# Patient Record
Sex: FEMALE
[De-identification: names, ages, dates, MRNs, and addresses within clinical notes are randomized per-mention and may not be internally consistent; named-entity substitution may affect disease eponyms.]

---

## 2019-01-31 ENCOUNTER — HOSPITAL ENCOUNTER (INPATIENT)
Dept: HOSPITAL 74 - JLDR | Age: 39
LOS: 3 days | Discharge: HOME | DRG: 540 | End: 2019-02-03
Attending: OBSTETRICS & GYNECOLOGY | Admitting: OBSTETRICS & GYNECOLOGY
Payer: COMMERCIAL

## 2019-01-31 VITALS — BODY MASS INDEX: 25.7 KG/M2

## 2019-01-31 DIAGNOSIS — Z22.330: ICD-10-CM

## 2019-01-31 DIAGNOSIS — Z3A.39: ICD-10-CM

## 2019-01-31 LAB
ANION GAP SERPL CALC-SCNC: 10 MMOL/L (ref 8–16)
APTT BLD: 26.4 SECONDS (ref 25.2–36.5)
BASOPHILS # BLD: 0.4 % (ref 0–2)
BUN SERPL-MCNC: 4 MG/DL (ref 7–18)
CALCIUM SERPL-MCNC: 8.6 MG/DL (ref 8.5–10.1)
CHLORIDE SERPL-SCNC: 107 MMOL/L (ref 98–107)
CO2 SERPL-SCNC: 21 MMOL/L (ref 21–32)
CREAT SERPL-MCNC: 0.7 MG/DL (ref 0.55–1.3)
DEPRECATED RDW RBC AUTO: 15.2 % (ref 11.6–15.6)
EOSINOPHIL # BLD: 0 % (ref 0–4.5)
GLUCOSE SERPL-MCNC: 226 MG/DL (ref 74–106)
HCT VFR BLD CALC: 43.4 % (ref 32.4–45.2)
HGB BLD-MCNC: 14.7 GM/DL (ref 10.7–15.3)
INR BLD: 0.85 (ref 0.83–1.09)
LYMPHOCYTES # BLD: 9 % (ref 8–40)
MCH RBC QN AUTO: 27.7 PG (ref 25.7–33.7)
MCHC RBC AUTO-ENTMCNC: 34 G/DL (ref 32–36)
MCV RBC: 81.5 FL (ref 80–96)
MONOCYTES # BLD AUTO: 4.7 % (ref 3.8–10.2)
NEUTROPHILS # BLD: 85.9 % (ref 42.8–82.8)
PLATELET # BLD AUTO: 150 K/MM3 (ref 134–434)
PMV BLD: 9.4 FL (ref 7.5–11.1)
POTASSIUM SERPLBLD-SCNC: 3.2 MMOL/L (ref 3.5–5.1)
PT PNL PPP: 10 SEC (ref 9.7–13)
RBC # BLD AUTO: 5.32 M/MM3 (ref 3.6–5.2)
SODIUM SERPL-SCNC: 137 MMOL/L (ref 136–145)
WBC # BLD AUTO: 12.3 K/MM3 (ref 4–10)

## 2019-01-31 RX ADMIN — Medication SCH: at 09:28

## 2019-01-31 RX ADMIN — FERROUS SULFATE TAB EC 324 MG (65 MG FE EQUIVALENT) SCH MG: 324 (65 FE) TABLET DELAYED RESPONSE at 17:29

## 2019-01-31 RX ADMIN — FERROUS SULFATE TAB EC 324 MG (65 MG FE EQUIVALENT) SCH: 324 (65 FE) TABLET DELAYED RESPONSE at 08:00

## 2019-01-31 NOTE — OP
Operative Note





- Note:


Operative Date: 01/31/19


Pre-Operative Diagnosis: Failure to descend, NRFHT, Failed TOLAC


Operation: Same


Findings: 





VMI, Direct OP, No nuchal or meconium. Weight pending. Apgars 7/9. Normal tubes 

and ovaries bilaterally


Surgeon: Deidre Schmidt


Assistant: Ray Araya


Anesthesia: Spinal


Estimated Blood Loss (mls): 600

## 2019-01-31 NOTE — OP
DATE OF OPERATION:  2019

 

PREOPERATIVE DIAGNOSIS:  A 39-week pregnancy, failure to descend, non-reassuring

fetal heart tracing, failed trial of labor after  section.  

 

POSTOPERATIVE DIAGNOSIS:  A 39-week pregnancy, failure to descend, non-reassuring

fetal heart tracing, failed trial of labor after  section.  

 

PROCEDURE:  Repeat low transverse  section.  

 

ANESTHESIA:  Spinal.

 

ESTIMATED BLOOD LOSS:  600 mL.

 

SURGEON:  Deidre Schmidt MD

 

ASSISTANT:  AJIT Singh

 

INTRAVENOUS FLUIDS:  Per anesthesia record.  

 

URINE OUTPUT:  Clear urine 200 mL at the end of the procedure.

 

FINDINGS:  Viable male infant, direct occiput posterior, Apgar 7, 9, 

weight pending, normal tubes and ovaries bilaterally.  

 

COMPLICATIONS:  None.

 

CONDITION:  Stable to recovery room

 

NATURE OF PROCEDURE:  After appropriate consents were signed, the patient was taken

to the operating room where spinal anesthesia was administered.  Watts catheter was

inserted.  The abdomen was prepped and draped in the normal sterile fashion. 

Time-out was performed confirming correct patient and procedure.  

 

A Pfannenstiel skin incision was made through the prior incision and carried through

to the underlying layers until the fascia was nicked in the midline.  The fascia was

then extended laterally with the Lugo scissors.  The inferior aspect of the fascia

was grasped with the Kocher clamps, tented upwards.  The rectus muscles were

dissected off bluntly and with the Lugo scissors.  Attention was then paid to the

superior aspect, which was taken down in a similar fashion.  The rectus muscles were

grasped in the midline with Allis clamps and  with the scalpel.  The

peritoneum was then entered bluntly.  Bladder blade was inserted.  Vesicular uterine

serosa reflection was grasped, nicked in the midline, and extended laterally with the

Metzenbaum scissors.  The bladder flap was then created digitally.  The bladder blade

was readjusted.  The uterus was then incised in the low transverse fashion.  Clear

amniotic fluid was noted.  

 

The infant was immediately noted to be direct OP presentation.  The Infants head was

delivered without difficulty.  The infants right shoulder was delivered without

difficulty.  The infants right arm had to be delivered to accommodate the left

shoulder for delivery.  The cord was clamped and cut.  The infant was handed off to

the awaiting pediatric staff.  The placenta was then removed manually.  The uterus

was cleared of all clot and debris.  

 

The hysterotomy was closed in 2 layers with a 0 Vicryl with good hemostasis.  The

muscle was then reapproximated with a 2-0 chromic.  The fascia was closed with a 0

Vicryl, the skin was closed with staples.  Sponge, lap, needle counts were correct

x3. Ancef was given at the start of the procedure.  Patient was taken from the

operating room to the recovery room in stable condition.  

 

 

MD WANDA [id number]

DD: 2019 03:10

DT: 2019 07:08

Job #:  [id number]

## 2019-01-31 NOTE — PN
Progress Note, Labor


  ** Vaginal Exam #1


Labor Exam Date: 19


Fetal Heart Rate (range): Cat II


Dilatation: 10


Effacement (%): 100


Amniotic Membrane Status: Ruptured


Fetal Presentation: Vertex/Position


Fetal Station: 0


Remarks: 





Patient pushing ineffectively for over 30 minutes now despite good coaching 

from team. Fetal vertex with little descent and only more caput. 


FHT with recurrent late decelerations and then prolonged bradycardia to the 80'

s with moderate variability. Fetal baseline has been 110's throughout labor 

course.


Given ineffective pushing, narrow pelvis and a good amount of pushing left in 

light of this fetal tracing,  delivery recommended.


Risks discussed, consents signed.


Anesthesia, nursery alerted.





JENI Schmidt MD

## 2019-01-31 NOTE — HP
Past Medical History





- Admission


Chief Complaint: Uterine  contractions


History of Present Illness: 





37yo  @ 39wks here with uterine contractions. +ROM. 





Preg c/b AMA, history of C/S x 1 for malpresentation, SCT, GBS positive


History Source: Patient





- Past Medical History


CNS: No: Alzheimer's, CVA, Dementia, Migraine, Multiple Sclerosis, Peripheral 

Neuropathy, Parkinson's, Seizure, Syncope, TIA, Vertigo, Other


Cardiovascular: No: AFIB, Aneurysm, Aortic Insufficiency, Aortic Stenosis, CAD, 

CHF, Deep Vein Thrombosis, HTN, Hyperlipdemia, MI, Mitral Insufficiency, Mitral 

Stenosis, Murmur, Pulmonary Hypertension, Other


Pulmonary: No: Asthma, Bronchitis, Cancer, COPD, O2 Dependent, Pneumonia, 

Previously Intubated, Pulmonary Embolus, Pulmonary Fibrosis, Sleep Apnea, Other


Gastrointestinal: No: Ascites, Cancer, Constipation, Crohn's Disease, 

Diverticulitis, Diverticulosis, Esophageal Varices, Gastritis, GERD, GI Bleed, 

Hemorrhoids, Hiatal Hernia, Inflamatory Bowel Disease, Irritable Bowel Disease, 

Pancreatitis, Peptic Ulcer Disease, Ulcerative Colitis, Other


...: 2


...Para: 1


...Term: 1


... Weeks Gestation by Dates: 39.3


...EDC by Dates: 19


Heme/Onc: Yes: Sickle Cell Trait





- Past Surgical History


Past Surgical History: Yes: 


Hx Myomectomy: No


Hx Transabdominal Cerclage: No





- Smoking History


Smoking history: Never smoked





- Alcohol/Substance Use


Hx Alcohol Use: No


History of Substance Use: reports: None





- Social History


Usual Living Arrangement: Yes: With Spouse


ADL: Independent


History of Recent Travel: No





Review of Systems





- Review of Systems


Constitutional: denies: No Symptoms, Chills, Diaphoresis, Fever, Lethargy, Loss 

of Appetite, Malaise, Night Sweats, Unintentional Wgt. Loss, Weakness, Other


Eyes: denies: No Symptoms, Blind Spots, Blurred Vision, Double Vision, Eye Pain

, Floaters, Photophobia, Recent Change in Vision, Other


HENT: denies: No Symptoms, Difficult Swallowing, Ear Discharge, Ear Pain, 

Epistaxis, Gingival Bleeding, Hearing Loss, Mouth Swelling, Nasal Congestion, 

Ocular Prosthesis, Throat Pain, Toothache, Ringing in Ears, Other


Neck: denies: No Symptoms, Decreased ROM, Lumps, Pain on Movement, Stiffness, 

Swollen Glands, Tenderness, Other


Cardiovascular: denies: No Symptoms, Chest Pain, Edema, Palpitations, Shortness 

of Breath, Other


Respiratory: denies: No Symptoms, Cough, Exercise Intolerance, Hemoptysis, 

Orthopnea, PND, Snoring, SOB, SOB on Exertion, Wheezing, Other


Gastrointestinal: denies: No Symptoms, Abdominal Pain, Bloating, Constipation, 

Diarrhea, Dysphagia, Indigestion, Melena, Nausea, Rectal Bleeding, Vomiting, 

Vomiting Blood, Other


Genitourinary: denies: No Symptoms, Burning, Discharge, Dysuria, Flank Pain, 

Frequency, Hematuria, Incontinence, Lesions, Menses, Pain, Testicular Mass, 

Testicular Pain, Testicular Swelling, Urgency, Vaginal Bleeding, Other





Physical Exam - Maternity


Constitutional: Yes: Well Nourished


Eyes: Yes: WNL





- Abdominal Exam/OB


Number of Fetuses: Single


Fetal Presentation: Vertex


Contractions: Yes


Regularity: Regular


Intensity: Mod/Strong


Fetal Monitor Mode: External


Fetal Heart Rate Location: [facility]


Category: I


Accelerations: Uniform


Decelerations: Early





- Vaginal Exam/OB


Vaginal Bleediing: No


Dilatation (cm): 9-10


Effacement (%): 100


Amniotic Membrane Status: Ruptured


Nitrazine Test: Positive


Amniotic Fluid: Yes: Clear


Fetal Presentation: Vertex/Position


Fetal Station: 0





- Physical Exam


Edema: No





Assessment/Plan





37yo  @ 39+wks here in labor, prior C/S, for TOLAC


Admit to L&D


NPO, IVFs


Amp for GBS positive


CEFM, toco; Cat I on admission


Risk of TOLAC discussed including arrest of first/second stages of labor 

ultimately requiring , uterine rupture risk requiring emergent C-

Section delivery and it's associated maternal/fetal morbidity/mortality. All 

questions answered. Consents signed


TOLAC, anticipate 





JENI Schmidt MD

## 2019-02-01 LAB
BASOPHILS # BLD: 0.3 % (ref 0–2)
DEPRECATED RDW RBC AUTO: 15.3 % (ref 11.6–15.6)
EOSINOPHIL # BLD: 0.1 % (ref 0–4.5)
HCT VFR BLD CALC: 38.2 % (ref 32.4–45.2)
HGB BLD-MCNC: 12.8 GM/DL (ref 10.7–15.3)
LYMPHOCYTES # BLD: 7.3 % (ref 8–40)
MCH RBC QN AUTO: 27 PG (ref 25.7–33.7)
MCHC RBC AUTO-ENTMCNC: 33.5 G/DL (ref 32–36)
MCV RBC: 80.5 FL (ref 80–96)
MONOCYTES # BLD AUTO: 6.4 % (ref 3.8–10.2)
NEUTROPHILS # BLD: 85.9 % (ref 42.8–82.8)
PLATELET # BLD AUTO: 159 K/MM3 (ref 134–434)
PMV BLD: 9.6 FL (ref 7.5–11.1)
RBC # BLD AUTO: 4.75 M/MM3 (ref 3.6–5.2)
WBC # BLD AUTO: 17.7 K/MM3 (ref 4–10)

## 2019-02-01 RX ADMIN — IBUPROFEN PRN MG: 600 TABLET, FILM COATED ORAL at 02:30

## 2019-02-01 RX ADMIN — SIMETHICONE CHEW TAB 80 MG PRN MG: 80 TABLET ORAL at 21:21

## 2019-02-01 RX ADMIN — Medication SCH TAB: at 10:08

## 2019-02-01 RX ADMIN — FERROUS SULFATE TAB EC 324 MG (65 MG FE EQUIVALENT) SCH MG: 324 (65 FE) TABLET DELAYED RESPONSE at 10:07

## 2019-02-01 RX ADMIN — IBUPROFEN PRN MG: 600 TABLET, FILM COATED ORAL at 21:20

## 2019-02-01 RX ADMIN — IBUPROFEN PRN MG: 600 TABLET, FILM COATED ORAL at 16:55

## 2019-02-01 RX ADMIN — IBUPROFEN PRN MG: 600 TABLET, FILM COATED ORAL at 10:12

## 2019-02-01 RX ADMIN — SIMETHICONE CHEW TAB 80 MG PRN MG: 80 TABLET ORAL at 10:11

## 2019-02-01 RX ADMIN — FERROUS SULFATE TAB EC 324 MG (65 MG FE EQUIVALENT) SCH MG: 324 (65 FE) TABLET DELAYED RESPONSE at 16:55

## 2019-02-01 RX ADMIN — ACETAMINOPHEN PRN MG: 325 TABLET ORAL at 21:20

## 2019-02-01 RX ADMIN — ACETAMINOPHEN PRN MG: 325 TABLET ORAL at 02:30

## 2019-02-01 RX ADMIN — SIMETHICONE CHEW TAB 80 MG PRN MG: 80 TABLET ORAL at 02:31

## 2019-02-01 RX ADMIN — ACETAMINOPHEN PRN MG: 325 TABLET ORAL at 10:11

## 2019-02-01 RX ADMIN — ACETAMINOPHEN PRN MG: 325 TABLET ORAL at 16:55

## 2019-02-01 RX ADMIN — SIMETHICONE CHEW TAB 80 MG PRN MG: 80 TABLET ORAL at 16:55

## 2019-02-01 NOTE — PN
Progress Note (short form)





- Note


Progress Note: 





Post op day#1.S/P C Section under spinal anesthesia with duramorph 

uneventful.Patient stable and has little pain for which she is on medication.No 

any anesthesia related problem.Patient Dc from the anesthesia care.

## 2019-02-01 NOTE — PN
Progress Note (short form)





- Note


Progress Note: 





pod 1 s/p c/s , c/o low abdominal cramps, no excess vaginal bleeding


 CBC, BMP





 01/31/19 01:31 





 Last Vital Signs











Temp Pulse Resp BP Pulse Ox


 


 98.0 F   93 H  18   117/75   100 


 


 02/01/19 02:00  02/01/19 02:00  02/01/19 02:00  02/01/19 02:00  01/31/19 03:00








abdomen soft, no distension, no cva 


incision dry, clean 


no excess vaginal bleeding , no calf tenderness 


pod  1, s/p c/s , doing well 


plan ambulate, advance diet


pain management, cbc

## 2019-02-02 RX ADMIN — IBUPROFEN PRN MG: 600 TABLET, FILM COATED ORAL at 14:01

## 2019-02-02 RX ADMIN — SIMETHICONE CHEW TAB 80 MG PRN MG: 80 TABLET ORAL at 14:02

## 2019-02-02 RX ADMIN — FERROUS SULFATE TAB EC 324 MG (65 MG FE EQUIVALENT) SCH MG: 324 (65 FE) TABLET DELAYED RESPONSE at 07:59

## 2019-02-02 RX ADMIN — IBUPROFEN PRN MG: 600 TABLET, FILM COATED ORAL at 06:47

## 2019-02-02 RX ADMIN — IBUPROFEN PRN MG: 600 TABLET, FILM COATED ORAL at 21:56

## 2019-02-02 RX ADMIN — ACETAMINOPHEN PRN MG: 325 TABLET ORAL at 21:57

## 2019-02-02 RX ADMIN — Medication SCH TAB: at 10:45

## 2019-02-02 RX ADMIN — ACETAMINOPHEN PRN MG: 325 TABLET ORAL at 14:01

## 2019-02-02 RX ADMIN — FERROUS SULFATE TAB EC 324 MG (65 MG FE EQUIVALENT) SCH MG: 324 (65 FE) TABLET DELAYED RESPONSE at 18:14

## 2019-02-02 RX ADMIN — ACETAMINOPHEN PRN MG: 325 TABLET ORAL at 06:47

## 2019-02-02 RX ADMIN — SIMETHICONE CHEW TAB 80 MG PRN MG: 80 TABLET ORAL at 06:48

## 2019-02-02 NOTE — PN
Post Partum Progress Note


Post Partum Day: 2


Type of Delivery: Repeat C/S


Vital Signs: 


 Vital Signs











Temperature  99.0 F   19 22:00


 


Pulse Rate  82   19 22:00


 


Respiratory Rate  18   19 22:00


 


Blood Pressure  92/59 L  19 22:00


 


O2 Sat by Pulse Oximetry (%)  100   19 03:00











Uterus: Yes: Fundus below umbilicus


Incision: Yes: Dressing dry and intact, [place] intact


Abdomen/GI: Yes: Abdomen soft, Tolerating PO


Lochia: Yes: Rubra


Lochia, amount: Small


Extremities: Yes: Calves non-tender


Activity: Ambulating





- Labs


Labs: 


 CBC











WBC  17.7 K/mm3 (4.0-10.0)  H  19  06:45    


 


RBC  4.75 M/mm3 (3.60-5.2)   19  06:45    


 


Hgb  12.8 GM/dL (10.7-15.3)   19  06:45    


 


Hct  38.2 % (32.4-45.2)   19  06:45    


 


MCV  80.5 fl (80-96)   19  06:45    


 


MCH  27.0 pg (25.7-33.7)   19  06:45    


 


MCHC  33.5 g/dl (32.0-36.0)   19  06:45    


 


RDW  15.3 % (11.6-15.6)   19  06:45    


 


Plt Count  159 K/MM3 (134-434)   19  06:45    


 


MPV  9.6 fl (7.5-11.1)   19  06:45    


 


Absolute Neuts (auto)  15.2 K/mm3 (1.5-8.0)  H  19  06:45    


 


Neutrophils %  85.9 % (42.8-82.8)  H  19  06:45    


 


Lymphocytes %  7.3 % (8-40)  L  19  06:45    


 


Monocytes %  6.4 % (3.8-10.2)   19  06:45    


 


Eosinophils %  0.1 % (0-4.5)  D 19  06:45    


 


Basophils %  0.3 % (0-2.0)   19  06:45    


 


Nucleated RBC %  0 % (0-0)   19  06:45    














Assessment/Plan





39yo  s/p RLTCS, POD#2


Routine PP care


OOB, ambulate


Labs reviewed


D/C to home by POD#4





JENI Schmidt MD

## 2019-02-03 VITALS — DIASTOLIC BLOOD PRESSURE: 65 MMHG | SYSTOLIC BLOOD PRESSURE: 105 MMHG | HEART RATE: 77 BPM

## 2019-02-03 VITALS — TEMPERATURE: 98.1 F

## 2019-02-03 LAB
BASOPHILS # BLD: 0.4 % (ref 0–2)
DEPRECATED RDW RBC AUTO: 15.1 % (ref 11.6–15.6)
EOSINOPHIL # BLD: 1.8 % (ref 0–4.5)
HCT VFR BLD CALC: 33.6 % (ref 32.4–45.2)
HGB BLD-MCNC: 11.3 GM/DL (ref 10.7–15.3)
LYMPHOCYTES # BLD: 12.9 % (ref 8–40)
MCH RBC QN AUTO: 27.3 PG (ref 25.7–33.7)
MCHC RBC AUTO-ENTMCNC: 33.5 G/DL (ref 32–36)
MCV RBC: 81.3 FL (ref 80–96)
MONOCYTES # BLD AUTO: 5.3 % (ref 3.8–10.2)
NEUTROPHILS # BLD: 79.6 % (ref 42.8–82.8)
PLATELET # BLD AUTO: 177 K/MM3 (ref 134–434)
PMV BLD: 9.6 FL (ref 7.5–11.1)
RBC # BLD AUTO: 4.13 M/MM3 (ref 3.6–5.2)
WBC # BLD AUTO: 8.8 K/MM3 (ref 4–10)

## 2019-02-03 RX ADMIN — SIMETHICONE CHEW TAB 80 MG PRN MG: 80 TABLET ORAL at 07:31

## 2019-02-03 RX ADMIN — ACETAMINOPHEN PRN MG: 325 TABLET ORAL at 13:07

## 2019-02-03 RX ADMIN — IBUPROFEN PRN MG: 600 TABLET, FILM COATED ORAL at 13:06

## 2019-02-03 RX ADMIN — Medication SCH TAB: at 09:38

## 2019-02-03 RX ADMIN — SIMETHICONE CHEW TAB 80 MG PRN MG: 80 TABLET ORAL at 13:08

## 2019-02-03 RX ADMIN — ACETAMINOPHEN PRN MG: 325 TABLET ORAL at 07:32

## 2019-02-03 RX ADMIN — FERROUS SULFATE TAB EC 324 MG (65 MG FE EQUIVALENT) SCH MG: 324 (65 FE) TABLET DELAYED RESPONSE at 07:31

## 2019-02-03 RX ADMIN — IBUPROFEN PRN MG: 600 TABLET, FILM COATED ORAL at 07:31

## 2019-02-07 NOTE — PATH
Surgical Pathology Report



Patient Name:  EMI NGUYEN

Accession #:  

Med. Rec. #:  [id number]                                                        

   /Age/Gender:  1980 (Age: 38) / F

Account:  [id number]                                                          

             Location: [facility] OBS/GYN

Taken:  2019

Received:  2019

Reported:  2019

Physicians:  Deidre Schmidt

  



Specimen(s) Received

 PLACENTA 





Clinical History

, 39.6 weeks, previous  x1 (2016)

Nonreassuring fetal heart rate, failure to descend







Final Diagnosis

PLACENTA,  SECTION:

399 G THIRD TRIMESTER PLACENTA WITH TRIVASCULAR UMBILICAL CORD AND MILD ACUTE

CHORIOAMNIONITIS.





***Electronically Signed***

Michell Zafar M.D.





Gross Description

The specimen is received fresh labeled placenta and is a 399 gram, 18.5 x 13.0 x

2.4 cm. placenta with attached membranes and umbilical cord.  The attached

membranes are tan, translucent with focal opacities and insert marginally.  The

umbilical cord measures 17 cm. in length and averages 1.1 cm. in diameter.  The

cord inserts eccentrically, 3.5 cm. to the nearest margin.  No true knots or

strictures are identified. Cut surface of the umbilical cord reveals 3 vessels.

The fetal surface is grey-blue with minimal fibrin deposition and appropriate

caliber vessels.  The maternal surface is red-brown with focal defects. 

Sectioning reveals red-brown, spongy parenchyma.  No lesions are identified. 

Representative sections are submitted in three cassettes as follows: 1- membrane

rolls and umbilical cord; 2-3- full thickness sections of placenta.





[facility]

## 2019-02-08 NOTE — DS
Physical Examination


Vital Signs: 


 Vital Signs











Temperature  98.1 F   02/03/19 08:15


 


Pulse Rate  77   02/03/19 08:15


 


Respiratory Rate  18   02/03/19 08:15


 


Blood Pressure  105/65   02/03/19 08:15


 


O2 Sat by Pulse Oximetry (%)  100   01/31/19 03:00











Constitutional: Yes: Well Nourished, No Distress, Calm


Eyes: Yes: WNL, Conjunctiva Clear, EOM Intact


HENT: Yes: WNL, Atraumatic, Normocephalic


Neck: Yes: WNL, Supple, Trachea Midline


Cardiovascular: Yes: WNL, Regular Rate and Rhythm


Respiratory: Yes: WNL, Regular, CTA Bilaterally


Gastrointestinal: Yes: WNL, Normal Bowel Sounds


Musculoskeletal: Yes: WNL


Extremities: Yes: WNL


Edema: No


Integumentary: Yes: WNL


Neurological: Yes: WNL, Alert, Oriented


...Motor Strength: WNL


Psychiatric: Yes: WNL


Labs: 


 CBC, BMP





 02/03/19 05:15 





 01/31/19 01:31 











Discharge Summary


Reason For Visit: LABOR


Procedures: Principal: RLTCS


Hospital Course: 





Patient presented in active labor


Despite efforts, unable to push effectively to deliver the baby and the fetal 

heart tracing was becoming persistently Cat II.


She underwent a RLTCS


She met all postpartum milestones and was discharged home on POD#3





Deidre Schmidt MD


Condition: Stable





- Instructions


Diet, Activity, Other Instructions: 


Regular Diet


Referrals: 


Deidre Schmidt MD [Staff Physician] - 


Disposition: HOME





- Home Medications


Comprehensive Discharge Medication List: 


Ambulatory Orders





Prenatal Vitamins (Sjr) - 1 tab PO DAILY 01/31/19 


Ibuprofen [Motrin -] 600 mg PO QID PRN #28 tablet 02/02/19 


Oxycodone HCl/Acetaminophen [Percocet 5-325 mg Tablet -] 1 - 2 tab PO Q6H PRN #

20 tab MDD 4 02/02/19

## 2022-07-19 ENCOUNTER — HOSPITAL ENCOUNTER (EMERGENCY)
Dept: HOSPITAL 74 - JER | Age: 42
LOS: 1 days | Discharge: HOME | End: 2022-07-20
Payer: COMMERCIAL

## 2022-07-19 VITALS — BODY MASS INDEX: 23.5 KG/M2

## 2022-07-19 DIAGNOSIS — O36.4XX0: Primary | ICD-10-CM

## 2022-07-20 VITALS — HEART RATE: 70 BPM | SYSTOLIC BLOOD PRESSURE: 115 MMHG | TEMPERATURE: 98.8 F | DIASTOLIC BLOOD PRESSURE: 62 MMHG

## 2022-07-20 LAB
ALBUMIN SERPL-MCNC: 3.4 G/DL (ref 3.4–5)
ALP SERPL-CCNC: 112 U/L (ref 45–117)
ALT SERPL-CCNC: 21 U/L (ref 13–61)
ANION GAP SERPL CALC-SCNC: 7 MMOL/L (ref 8–16)
APPEARANCE UR: CLEAR
AST SERPL-CCNC: 14 U/L (ref 15–37)
BASOPHILS # BLD: 0.6 % (ref 0–2)
BILIRUB SERPL-MCNC: 0.7 MG/DL (ref 0.2–1)
BILIRUB UR STRIP.AUTO-MCNC: NEGATIVE MG/DL
BUN SERPL-MCNC: 7.9 MG/DL (ref 7–18)
CALCIUM SERPL-MCNC: 8.8 MG/DL (ref 8.5–10.1)
CHLORIDE SERPL-SCNC: 108 MMOL/L (ref 98–107)
CO2 SERPL-SCNC: 27 MMOL/L (ref 21–32)
COLOR UR: YELLOW
CREAT SERPL-MCNC: 0.5 MG/DL (ref 0.55–1.3)
DEPRECATED RDW RBC AUTO: 14.6 % (ref 11.6–15.6)
EOSINOPHIL # BLD: 0.7 % (ref 0–4.5)
GLUCOSE SERPL-MCNC: 105 MG/DL (ref 74–106)
HCT VFR BLD CALC: 40.5 % (ref 32.4–45.2)
HGB BLD-MCNC: 13.4 GM/DL (ref 10.7–15.3)
KETONES UR QL STRIP: NEGATIVE
LEUKOCYTE ESTERASE UR QL STRIP.AUTO: NEGATIVE
LYMPHOCYTES # BLD: 31.2 % (ref 8–40)
MCH RBC QN AUTO: 26.3 PG (ref 25.7–33.7)
MCHC RBC AUTO-ENTMCNC: 33 G/DL (ref 32–36)
MCV RBC: 79.7 FL (ref 80–96)
MONOCYTES # BLD AUTO: 7.7 % (ref 3.8–10.2)
NEUTROPHILS # BLD: 59.8 % (ref 42.8–82.8)
NITRITE UR QL STRIP: NEGATIVE
PH UR: 6 [PH] (ref 5–8)
PLATELET # BLD AUTO: 195 10^3/UL (ref 134–434)
PMV BLD: 9.7 FL (ref 7.5–11.1)
PROT SERPL-MCNC: 7.1 G/DL (ref 6.4–8.2)
PROT UR QL STRIP: NEGATIVE
PROT UR QL STRIP: NEGATIVE
RBC # BLD AUTO: 5.08 M/MM3 (ref 3.6–5.2)
SODIUM SERPL-SCNC: 141 MMOL/L (ref 136–145)
SP GR UR: 1.02 (ref 1.01–1.03)
UROBILINOGEN UR STRIP-MCNC: 0.2 MG/DL (ref 0.2–1)
WBC # BLD AUTO: 6.2 K/MM3 (ref 4–10)